# Patient Record
Sex: FEMALE | Race: BLACK OR AFRICAN AMERICAN | NOT HISPANIC OR LATINO | Employment: PART TIME | URBAN - METROPOLITAN AREA
[De-identification: names, ages, dates, MRNs, and addresses within clinical notes are randomized per-mention and may not be internally consistent; named-entity substitution may affect disease eponyms.]

---

## 2024-07-07 ENCOUNTER — ANCILLARY PROCEDURE (OUTPATIENT)
Dept: GENERAL RADIOLOGY | Facility: CLINIC | Age: 19
End: 2024-07-07
Attending: PHYSICIAN ASSISTANT
Payer: COMMERCIAL

## 2024-07-07 ENCOUNTER — OFFICE VISIT (OUTPATIENT)
Dept: URGENT CARE | Facility: URGENT CARE | Age: 19
End: 2024-07-07
Payer: COMMERCIAL

## 2024-07-07 VITALS
WEIGHT: 253 LBS | DIASTOLIC BLOOD PRESSURE: 73 MMHG | SYSTOLIC BLOOD PRESSURE: 116 MMHG | TEMPERATURE: 97.3 F | OXYGEN SATURATION: 100 % | RESPIRATION RATE: 20 BRPM | HEART RATE: 85 BPM

## 2024-07-07 DIAGNOSIS — T14.8XXA SLIVER: Primary | ICD-10-CM

## 2024-07-07 DIAGNOSIS — T14.8XXA SLIVER: ICD-10-CM

## 2024-07-07 PROCEDURE — 73630 X-RAY EXAM OF FOOT: CPT | Mod: TC | Performed by: RADIOLOGY

## 2024-07-07 PROCEDURE — 99204 OFFICE O/P NEW MOD 45 MIN: CPT | Performed by: PHYSICIAN ASSISTANT

## 2024-07-07 RX ORDER — LEVOFLOXACIN 750 MG/1
750 TABLET, FILM COATED ORAL DAILY
Qty: 5 TABLET | Refills: 0 | Status: SHIPPED | OUTPATIENT
Start: 2024-07-07 | End: 2024-07-12

## 2024-07-07 RX ORDER — ESCITALOPRAM OXALATE 10 MG/1
10 TABLET ORAL
COMMUNITY
Start: 2024-05-21

## 2024-07-07 ASSESSMENT — PAIN SCALES - GENERAL: PAINLEVEL: SEVERE PAIN (7)

## 2024-07-07 NOTE — PROGRESS NOTES
SUBJECTIVE:  Lidia Hernandez is a 19 year old female who presents to the clinic today for sliver in right foot.  Noticed this morning, took part out, some seems to remain. Was barefoot in a portopotty after being barefoot on wood deck.       No past medical history on file.  Current Outpatient Medications   Medication Sig Dispense Refill    escitalopram (LEXAPRO) 10 MG tablet Take 10 mg by mouth      levofloxacin (LEVAQUIN) 750 MG tablet Take 1 tablet (750 mg) by mouth daily for 5 days 5 tablet 0     Social History     Tobacco Use    Smoking status: Never     Passive exposure: Never    Smokeless tobacco: Never   Substance Use Topics    Alcohol use: Not on file       ROS:  Review of systems negative except as stated above.    EXAM:   /73 (BP Location: Right arm, Patient Position: Sitting, Cuff Size: Adult Large)   Pulse 85   Temp 97.3  F (36.3  C) (Temporal)   Resp 20   Wt 114.8 kg (253 lb)   LMP 07/07/2024 (Exact Date)   SpO2 100%   GENERAL: alert, no acute distress.  SKIN: puncture appreciated, no definite remnant matter appreciated      ASSESSMENT:  (T14.8XXA) Sliver  (primary encounter diagnosis)  Plan: XR Foot Right G/E 3 Views, levofloxacin         (LEVAQUIN) 750 MG tablet, Adult Gen Surg          Referral            Patient Instructions   Drawing salve  Donut cushions  Follow with GS if not improving  Return if worsening      Red flags and emergent follow up discussed, and understood by patient  Follow up with PCP if symptoms worsen or fail to improve

## 2024-12-15 ENCOUNTER — HEALTH MAINTENANCE LETTER (OUTPATIENT)
Age: 19
End: 2024-12-15